# Patient Record
Sex: FEMALE | Race: WHITE | NOT HISPANIC OR LATINO | Employment: OTHER | URBAN - METROPOLITAN AREA
[De-identification: names, ages, dates, MRNs, and addresses within clinical notes are randomized per-mention and may not be internally consistent; named-entity substitution may affect disease eponyms.]

---

## 2023-02-14 ENCOUNTER — TELEPHONE (OUTPATIENT)
Dept: NEUROLOGY | Facility: CLINIC | Age: 73
End: 2023-02-14
Payer: MEDICARE

## 2023-02-14 NOTE — TELEPHONE ENCOUNTER
Spoke briefly with  Viet regarding scheduling options for Sof[facility]. We tentatively secured 2/15/23 at 9:15 AM w/ Dr. Zamudio. I will explore options for a Thursday visit and availability of accomodations at the [facility].      Spoke with Viet to complete patient profile, issue invite to establish portal account, and confirmed 2/15 at 9:15 AM with Dr. Zamudio. I shared some initial information on scheduling with the [facility] after confirming vacancy for the [facility].

## 2023-02-15 ENCOUNTER — OFFICE VISIT (OUTPATIENT)
Dept: NEUROLOGY | Facility: CLINIC | Age: 73
End: 2023-02-15
Payer: MEDICARE

## 2023-02-15 ENCOUNTER — HOSPITAL ENCOUNTER (OUTPATIENT)
Dept: RADIOLOGY | Facility: HOSPITAL | Age: 73
Discharge: HOME | End: 2023-02-15
Attending: PSYCHIATRY & NEUROLOGY
Payer: MEDICARE

## 2023-02-15 VITALS — WEIGHT: 156.63 LBS

## 2023-02-15 DIAGNOSIS — G31.84 MCI (MILD COGNITIVE IMPAIRMENT): Primary | ICD-10-CM

## 2023-02-15 DIAGNOSIS — K90.9 INTESTINAL MALABSORPTION, UNSPECIFIED TYPE: ICD-10-CM

## 2023-02-15 DIAGNOSIS — G31.84 MCI (MILD COGNITIVE IMPAIRMENT): ICD-10-CM

## 2023-02-15 PROCEDURE — 70551 MRI BRAIN STEM W/O DYE: CPT | Mod: 26,,, | Performed by: RADIOLOGY

## 2023-02-15 PROCEDURE — 96132 NRPSYC TST EVAL PHYS/QHP 1ST: CPT | Mod: 59,,, | Performed by: PSYCHIATRY & NEUROLOGY

## 2023-02-15 PROCEDURE — 96132 PR NEUROPSYCHOLOGIC TEST EVAL SVCS, 1ST HR: ICD-10-PCS | Mod: 59,,, | Performed by: PSYCHIATRY & NEUROLOGY

## 2023-02-15 PROCEDURE — 99205 OFFICE O/P NEW HI 60 MIN: CPT | Mod: S$PBB,,, | Performed by: PSYCHIATRY & NEUROLOGY

## 2023-02-15 PROCEDURE — 99213 OFFICE O/P EST LOW 20 MIN: CPT | Mod: PBBFAC,25 | Performed by: PSYCHIATRY & NEUROLOGY

## 2023-02-15 PROCEDURE — 96116 NUBHVL XM PHYS/QHP 1ST HR: CPT | Mod: PBBFAC | Performed by: PSYCHIATRY & NEUROLOGY

## 2023-02-15 PROCEDURE — 70551 MRI BRAIN STEM W/O DYE: CPT | Mod: TC

## 2023-02-15 PROCEDURE — 96116 PR NEUROBEHAVIORAL STATUS EXAM BY PSYCH/PHYS: ICD-10-PCS | Mod: S$PBB,59,, | Performed by: PSYCHIATRY & NEUROLOGY

## 2023-02-15 PROCEDURE — 70551 MRI BRAIN WITHOUT CONTRAST: ICD-10-PCS | Mod: 26,,, | Performed by: RADIOLOGY

## 2023-02-15 PROCEDURE — 99999 PR PBB SHADOW E&M-EST. PATIENT-LVL III: ICD-10-PCS | Mod: PBBFAC,,, | Performed by: PSYCHIATRY & NEUROLOGY

## 2023-02-15 PROCEDURE — 96116 NUBHVL XM PHYS/QHP 1ST HR: CPT | Mod: S$PBB,59,, | Performed by: PSYCHIATRY & NEUROLOGY

## 2023-02-15 PROCEDURE — 99205 PR OFFICE/OUTPT VISIT, NEW, LEVL V, 60-74 MIN: ICD-10-PCS | Mod: S$PBB,,, | Performed by: PSYCHIATRY & NEUROLOGY

## 2023-02-15 PROCEDURE — 99999 PR PBB SHADOW E&M-EST. PATIENT-LVL III: CPT | Mod: PBBFAC,,, | Performed by: PSYCHIATRY & NEUROLOGY

## 2023-02-15 RX ORDER — WARFARIN 7.5 MG/1
7.5 TABLET ORAL DAILY
COMMUNITY

## 2023-02-15 RX ORDER — CAPTOPRIL 12.5 MG/1
12.5 TABLET ORAL 3 TIMES DAILY
COMMUNITY

## 2023-02-15 NOTE — PROGRESS NOTES
[facility]     PATIENT: Sofy Mcmullen  VISIT DATE: 02/15/2023  MRN: [id number]  PRIMARY PROVIDER: Primary Doctor No  : 1950       Chief complaint: Progressive Cognitive Impairment     History of present illness:      The patient is a 73-year-old right-handed female who presents today to the [facility] due to concerns related to Progressive Cognitive Impairment.  The patient is accompanied by the  who participates in providing history.  Additional information is obtained by reviewing available medical records.     Relevant Background/Context  Known Relevant Family history:  Mother -  at age 79 CAD/MI, LOAD  Father -  at age 70 COPD  Neurocognitive Disorder:  Mother - LOAD onset mid to late 70s,  late 70s  Maternal Grandmother - LOAD onset 80s,  90s  Movement Disorder:  The patient/family denies a history of PD, PDD, tremor.  Motorneuron Disorder:  The patient/family denies a history of ALS, MND, PLS.  Developmental Disorder:  The patient/family denies a history of Dyslexia, ADHD, ASD.  Psychiatric Disorder:  The patient/family denies a history of MDD, BD, JANIS, Schizophrenia.  Known Relevant Genetics:  There is no known relevant genetic testing available.  Developmental Milestones:  The patient/family report no known birth complications or early life problems. The patient met all developmental milestones.  Education/Learning Capacity:  The patient/family report no signs or symptoms suggestive of developmental learning disorder.  HS  BA. + 3.5 years  Dental Hygiene +2  Estimated Educational Experience: 18 years of formal education.  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  Retired/Quit: 2015  Relevant Medical History:  HTN  aortic valve replacement     Neurocognitive Disorder Features  Onset/Duration:  Nov 2020 (~2-year)  First Symptom:  Memory impairment  Progression:  Step-wise Progressive  Clinical Course:  Unknown     Current Presentation  Recent/Interim History:  The patient presents with her , who is the primary historian. There are no additional medical records from the patient's previous encounters as such history is limited. Her family reports concerns regarding new cognitive impairment as early as November 2020. Her family stated that around Thanksgiving 2020, the patient's children had told her on multiple occasions they would not be able to attend Thanksving due to COVID-related restrictions. The patient had difficulty remembering this when the holiday came around despite multiple reminders. Her family reported that she became very distressed over 3-4 days. She would need frequent reminders that the children would not be coming for the holidays. In retrospect, her family thought this was atypical; however, there were no overt signs or symptoms concerning short-term memory loss beyond this event. Over the next two years, in COVID-related confinement, the patient's  began reporting increasing concerns about short-term memory loss. The patient's  reports that she became more forgetful, repeating herself and needing reminders of conversations. This was otherwise relatively mild and did not interfere with daily activities. She remained fully independent in all instrumental activities of daily living, including her chores and hobbies. Her family thought this was largely within normal limits for aging. Her behavior/cognition was otherwise stable until December 2022. In December 2022, the patient and her  would travel to [place] for a wedding. Her  reports that he was sick with a nonspecific  viral illness approximately 2-4 weeks before the event, eventually being diagnosed with COVID. The patient would only report minor symptoms of a nonspecific cold during this time. Following the wedding, the patient would wake up in an abulic vs. lethargic state. Her  reports that he had difficulty getting her out of bed. He denies that she was challenging to arouse but lacked the motivation to get out of bed and go home. On the drive home, the patient had difficulty using the map and could not help them drive home. Since this time, the patient has had significant motivation loss. She is largely stopped most household responsibilities and hobbies. Her family reports it is unclear whether the patient has stopped doing these tasks because she is unable to perform them or she lacks the motivation to perform them. Her memory has acutely worsened, having difficulty with attention and concentration train of thought, and she often forgets conversations within minutes. Her family denies any overt language, movement, and visual-spatial problems aside from difficulty navigating the day after the wedding. Regardless the patient has stopped driving. The patient has limited insight into her deficits and has no recollection previous month. The local primary care physician evaluated the patient and ordered a CT head. CT head was reportedly normal; however, imaging and radiological read are unavailable for a secondary review. Her family was present upon referral for rapid decline requesting further evaluation and 2nd opinion.  Unresolved Concern(s) reported by patient/family:  Long COVID vs post-COVID complications  Maternally inherited risk factor for LOAD     Review of cognitive, visuospatial, motor, sensory, and behavioral systems:     Memory:   The patient's memory has worsened in the past few years.  She does repeat statements or asks the same question repeatedly.  She does have difficulty remembering recent important  conversations.  She does have difficulty remembering recent events.  She does forget information within minutes.  Her recent retrograde memory is impaired.  Her remote memory is intact.  Attention:   The patient's attention and concentration are impaired.  She does not have attentional fluctuations.  She does have difficulty with selective attention.  She does become easily distracted.  She does have difficulty with divided attention.  Executive:   The patient's cognitive processing speed is slower.  She does have difficulty with working memory.  She does misplace personal items (e.g., keys, cell phone, wallet) more frequently.  She does have difficulty keeping track of her medications.  She does have difficulty with planning/organizing/completing multistep tasks.  She does have difficulty with executive attention.  She does have difficulty with flexible thinking.  She does not have difficulty with response inhibition.  She denies new impulsivity or rash/careless actions.  Her judgment is intact.  Language:   The patient's speech is affected.  She does forget people's names more frequently.  She does not have word-finding difficulties.  Her speech is fluent and non-effortful.  Her speech is grammatically intact.  She does not make word substitutions.  She does not have difficulty reading.  She does not appear to have impaired comprehension.  Visuospatial:   The patient has new visuospatial problems.  She has become confused or disoriented in *new*, unfamiliar places.  She does not have trouble with navigation.  She does not get lost in familiar places.  She does not have visuospatial disorientation.  She does not have difficulty recognizing objects or faces.  She denies problems with driving or parking.  Motor/Coordination:   The patient does not have difficulty with walking.  She does not feel imbalanced.  She denies having fallen.  She does not appear to have new muscle weakness.  She does not have difficulty  buttoning shirts, operating zippers, or manipulating tools/utensils.  Her handwriting has not become micrographic.  She does not have a resting tremor.  She denies having any new involuntary movements and/or muscle jerking.  She does not have swallowing difficulty.  She denies new muscle cramps and twitching.  Sensory:   The patient denies new numbness, tingling, paresthesias, or pain.  The patient denies a loss of vision, blurry vision, or double vision.  The patient denies new loss of hearing or worsening tinnitus.  The patient denies anosmia.  Sleep:   The patient reports difficulty sleeping.  The patient does not have difficulty going to sleep.  The patient denies difficulty staying asleep or frequently awakening at night.  The patient does snore and/or have witnessed apneas while sleeping.  When she wakes up in the morning, she does not feel well-rested.  She denies dream-enactment behavior.  She denies symptoms suggestive of restless leg syndrome.  Behavior:   The patient's personality has changed.  She does not have symptoms of disinhibition and social inappropriateness.  She does not have symptoms to suggest a loss of manners or decorum.  She does not have apathy and/or decreased motivation.  She does appear to have had a change in behavioral/emotional inertia.  The patient's emotional expression has changed.  She does not have emotional blunting or lability.  She does not have symptoms of irritability and mood lability.  She does not have symptoms of agitation, aggression, or violent outbursts.  Her insight into his disease and situation is impaired.  Her personal hygiene is intact.  She is not exhibiting a diminished response to other people's needs and feelings.  She is not exhibiting a diminished social interest, interrelatedness, or personal warmth.  She denies restlessness.  She denies new and/or worsening simple repetitive behaviors.  Her speech has not become simplified or become  repetitive/stereotyped.  She denies new/worsening complex repetitive/ritualistic compulsions and behaviors.  She does not have symptoms of hyper-religiosity or dogmatism.  Her interests/pleasures have not become restrictive, simplified, interrupting, or repetitive.  She denies a change of self-stimulating behavior.  She has had changes in eating behavior.  She has been exhibited symptoms to suggest increased consumption of food and/or alcohol/cigarettes.  She denies oral exploration or consumption of inedible objects.  Psychiatric:   She does not feel depressed.  She is not exhibiting symptoms of social withdrawal/indifference.  She denies anxiety.  She does not exhibit cycling behavior.  She does not exhibit hyperactive behavior.  She is not exhibited symptoms of paranoia.  She does not have delusions.  She does not have hallucinations.  She does not have a history of sensitivity to neuroleptic/psychotropic medications.  Medical Review of Systems:   The patient does not have constipation.  The patient does not have urinary incontinence.  The patient denies orthostatic lightheadedness.  The patient's weight is stable.  Functional status:  Difficulty performing the following Instrumental ADLs:  Housekeeping: No Comment: unclear, patient appears to have limited motivation, unclear if she is unable to perform  Food Preparation: No Comment: unclear, patient appears to have limited motivation, unclear if she is unable to perform  Shopping: No Comment: unclear, patient appears to have limited motivation, unclear if she is unable to perform  Ability to Handle Finances: Yes  Transportation/Driving: No Comment: unclear, patient appears to have limited motivation, unclear if she is unable to perform  Household Appliances/Stove: No Comment: unclear, patient appears to have limited motivation, unclear if she is unable to perform  Laundry: No Comment: unclear, patient appears to have limited motivation, unclear if she is unable  to perform  Difficulty performing the following Basic ADLs:  Dressing: No  Bathing: No  Toileting: No  Personal hygiene and grooming: No  Feeding: No  Care Management:  Patient/Family Safety Concerns:  Medication Adherence: No  Home Safety: No  Wandered: No  Firearms: No  Fall Risk: No  Home Alone: No          No past medical history on file.    No past surgical history on file.    No family history on file.    Social History     Socioeconomic History    Marital status:        Medication:     Current Outpatient Medications on File Prior to Visit   Medication Sig Dispense Refill    captopriL (CAPOTEN) 12.5 MG tablet Take 12.5 mg by mouth 3 (three) times daily.      warfarin (COUMADIN) 7.5 MG tablet Take 7.5 mg by mouth once daily.       No current facility-administered medications on file prior to visit.        Review of patient's allergies indicates:  No Known Allergies    Medications Reconciliation:   I have reconciled the patient's home medications and discharge medications with the patient/family. I have updated all changes.  Refer to After-Visit Medication List.    Objective:  Vital Signs:  There were no vitals filed for this visit.  Wt Readings from Last 3 Encounters:   02/15/23 0937 71.1 kg (156 lb 10.2 oz)     There is no height or weight on file to calculate BMI.           Neurological examination:  Mental Status:   Her appearance was abnormal.   Comment: looks older than stated age;  Throughout the interview, she is cooperative, her eye contact is appropriate.  Her behavior is appropriate to the clinical context without impropriety or improper language/conduct.  Her behavior was not characterized by episodes of sudden uncontrollable and inappropriate laughing or crying.  The patient's energy level is abnormal.   Comment: Hypoactive;  Her orientation is normal; Spatial 5/5 (location, the floor of building, city, county, state) and temporal 5/5 (month, day, year, GARY) dimensions are accurate.  Her  "attention/concentration is impaired.  She can complete three-step commands.  Her fund of knowledge was appropriate for age, culture, and level of education.  Her thought process is not logical or goal-oriented.   Comment: bradyphrenia;  She demonstrated impaired insight based on actions, awareness of her illness, plans for the future.  She demonstrated good judgment based on actions and plans for the future.  She has no evidence of hallucinations (auditory, visual, olfactory).  She has no evidence of delusions (paranoid, grandiose, bizarre).  Cranial Nerves:   Her pupils were normal.  Her visual fields were full to confrontation in all quadrants.  Her ocular pursuit in the horizontal and vertical plane was complete.  Her saccadic initiation, velocity, and amplitude are normal.  Her eyelid assessment showed no apraxia. There was no eyelid dysfunction, retraction, or webb sign.  Her facial strength was normal.  Her facial expression was symmetric and appropriate to the context.  Her facial expression was normal without evidence of hypomimia.  Her tongue showed no evidence of scalloping.  She can protrude their tongue beyond Her lips for >10 sec.  Speech/Language:   The patient's speech was fluent, non-effortful, and her rate was appropriate to the context.  Her speech volume is within normal range and appropriate to the context.   Comment: quiet;  Her speech rate is normal.  Her respirations are within normal range and appropriate to context.  Her speech timbre is normal.  She has no articulation (segmental features) errors.  The patient's speech is not dysarthric.  The patient's speech was without evidence of anomia.  She makes no phonological loop errors.  She makes no errors during the repetition of gibberish words (e.g., "Supercalifragilisticexpialidocious," "Pigglywiggly," "Woospiedoo," "Zowzy," "Bazinga").  She makes no errors during the repetition of complex meaningless phrases (e.g., "The horse raced past " "the barn fell.", "The complex houses  and single soldiers and their families," "Wishes are hopping, and trees are west," and "Brushing liked to german leon's direction").  She makes no superordinate errors when asked to draw an animal (e.g., elephant/giraffe).  Motor:   The patient's bilateral upper extremity muscle bulk is appropriate.  The patient's upper extremity muscle tone is increased.   Comment: L, Mild;  The patient's bilateral upper extremity muscle tone does not suggest spasticity.  The patient's bilateral upper extremity muscle tone does not suggest rigidity/cogwheeling.  There is evidence of paratonia.   Comment: Muscle tone is increased and there is evidence of paratonia.; Muscle tone is increased and there is evidence of paratonia.  Assessment of motor strength was symmetric and at minimal anti-gravity.  There is no pronator or downward drift.  There is no myoclonus observed in The patient's bilateral upper and lower extremities.  There are no fasciculations observed in The patient's bilateral upper and lower extremities.  Coordination:   She has no bilateral upper extremity limb dysmetria or past pointing on finger-nose-finger bilaterally.  She has no limb dysdiadochokinesia of the upper extremity on the pronation/supination test and screwing in a light bulb or lower extremity during tapping ball of each foot bilaterally.  She has no visible tremor.  She has evidence of interhemispheric motor control deficits.  She demonstrates evidence of motor overflow.  She has dyskinetic movements.   Comment: L>R; L>R  She has evidence of akathisia.  The patient's upper extremity fine motor coordination was abnormal.   Comment: L, Mild;  The patient's bilateral upper extremity coordination with finger tapping, pronation/supination, and the open-close fist showed slowing.  The patient's upper extremity fine motor coordination was not hypometric.   Comment: finger tapping, pronation/supination, and the " open-close fist showed hypometria.; finger tapping, pronation/supination, and the open-close fist showed hypometria.  The patient's upper extremity fine motor coordination was not dysrhythmic.   Comment: finger tapping, pronation/supination, and the open-close fist showed dysrhythmia.; finger tapping, pronation/supination, and the open-close fist showed dysrhythmia.  Higher Cortical Function:   The patient showed no evidence of simultanagnosia (Navon hierarchical letters).  She demonstrates no evidence of dorsal simultanagnosia (overlapping objects).  She demonstrates no evidence of ventral simultanagnosia (complex picture synthesis).  The patient showed no evidence of visuospatial constructional dysfunction.  She has no evidence of dysgraphia.  She showed no dysexecutive behavior.  She showed no utilization or imitation behavior.  She has no perseverative or stereotyped behavior.  She has no stimulus-bound behavior.  Sensory:   Her cortical sensory assessment demonstrated no neglect bilaterally.  She he had no astereognosis (paper clip, ring, dime) bilaterally in the palms.  She he had no agraphesthesia (drawing numbers) in the palms.  Reflexes:   Reflexes were symmetric and 2+ at biceps, 2+ triceps, and 2+ brachioradialis, 2+ at the knees bilaterally, there was no cross-abductor sign, 2+ in the bilateral ankles.  Gait:   She has normal posture sitting unaided.  She is unable to rise from a chair and sit back down without using their arms.  Her gait was abnormal.  Her posture while walking is normal.  Her gait initiation/inhibition was normal.  Her stance while walking is normal.  Her gait speed was abnormal (70-80 F 1.13 m/s M 1.26 m/s, >80 F 0.94 m/sec, M 0.97 m/sec).   Comment: slow;  Her stride (gait cycle) was abnormal.   Comment: decreased step-time;  Her arms swing is abnormal.   Comment: L; asymmetric and decreased amplitude.  She takes turns in >4 steps.  When attempting to walk abnormally (heels, tiptoes,  tandem), she makes no errors.  Neuropsychological Evaluation Summary:  Prior Neurocognitive/Neurobehavioral Evaluation(s)  No Prior Testing Available  Neurocognitive/Neurobehavioral Evaluation completed on 2023-02-15    Memory    Registration-3 3/3 Within Normal Limits.   Recall-3 3/3 Within Normal Limits.   Recall-5 3/5 Impairment: Moderate.   Registration-5 5/0     T1 5/9 Within Normal Limits.   T2 5/9 Within Normal Limits.   T3 6/9 Within Normal Limits.   T4 5/9 Impairment: -2.5 STDs below the average score based on age and education.   T5 5/9 Impairment: -2.5 STDs below the average score based on age and education.   DR-30 Sec 2/9 Impairment: -3.5 STDs below the average score based on age and education.   DR-10 min 3/9 Impairment: -1.6 STDs below the average score based on age and education.   Executive    Three-step command 3/3 Within Normal Limits.   Trials-1 0/1 Impairment: Significant.   WORLD Backward 5/5 Within Normal Limits.   Digit Span - 2 2/2 Within Normal Limits.   Serial Sevens 3/3 Within Normal Limits.   Fluency 1/1 Within Normal Limits.   Digit Span Backwards 4 Impairment: -1.5 STDs below the average score based on age and education.   Lexical Fluency - D 10 Within Normal Limits.   Lexical Fluency - F 9 Impairment: -1.6 STDs below the average score based on age and education.   Lexical Fluency - A 10 Within Normal Limits.   Semantic Fluency - Animals 13 Impairment: -2.1 STDs below the average score based on age and education.   Visuospatial    Intersecting Pentagons 1/1 Within Normal Limits.   3D Cube Copy 0/1 Impairment: Significant.   Clock Draw 3/3 Within Normal Limits.   Yo Copy 14/17 Impairment: Mild to Normal.   Overlapping Images - Update 12/12 Within Normal Limits.   Picture Synthesis 3/3 Within Normal Limits.   Noise Pareidolia Test 5/5 Within Normal Limits.   Attention    Orientation-10 9/10 Impairment: Mild to Normal.   Orientation-6 5/6 Impairment: Mild to Normal.   Alternating  Sequence 1/1 Within Normal Limits.   Digit Span Forwards 7 Within Normal Limits.   Language    Repetition-1 1/1 Within Normal Limits.   Naming-2 2/2 Within Normal Limits.   Following written command 1/1 Within Normal Limits.   Writing a complete sentence 1/1 Within Normal Limits.   Naming-3 3/3 Within Normal Limits.   Repetition-2 2/2 Within Normal Limits.   Abstraction 2/2 Within Normal Limits.   15-Item BNT 15/15 Within Normal Limits.   Repetition of Phrases 5/5 Within Normal Limits.   Verbal Agility 6/6 Within Normal Limits.   SYDBAT - Semantic Association 30/30 Within Normal Limits.   Repeat & Point - Nonfluent 10/10 Within Normal Limits.   Repeat & Point - Semantics 10/10 Within Normal Limits.   Neurocognitive Focused Evaluation Aggregate Score(s)    MMSE 29/30 MMSE Score suggestive of normal to questionable cognitive impairment.   MOCA 25/30 MOCA Score suggestive of mild cognitive impairment.   Neuropsychiatric/Behavioral Focused Evaluation Assessment    BEHAV5+ 1/6 See ROS section for a full description           Neuroimaging:  No brain imaging is currently available for review.     Procedures:  No behavioral neurology relevant procedures are currently available for review.     Clinical Summary:     The patient is a 73-year-old right-handed female with a relevant past medical history of HTN, aortic valve replacement in 2000, who presents reporting a 2-year history of step-wise progressive neurocognitive impairment.       The clinical history is suggestive of:  Memory Impairment: STM encoding impairment, LTM encoding-retrieval impairment, Amnesia of fixation  Attention Impairment: Attention, Selective attention, Sustained attention, Shifting attention  Executive Impairment: Energization, Working Memory  Language Impairment: Language Dysfunction  Visuospatial Impairment: Allocentric Spatial Processing  Behavior Impairment: Response Inhibition, Neurovegetative, Emotional Regulation, Self-Preservation  Dysregulation, Stimulation Dysregulation  iADL Impairment: [place] Instrumental Activities of Daily Living Scale  The neurological examination is significant for:  Cortical Transcallosal Disconnection: interhemispheric motor control (interhemispheric motor control ), motor efference (motor overflow)  Executive Impairment: thought disorder  Movement Disorder (Gait): strength (difficulty rising), abnormal features (speed, stride/cycle, abnormal swing, difficulty turning)  Movement Disorder (Hyperkinetic): dyskinetic movements, akathisia  Movement Disorder (Hypokinetic): paratonia (tone), parkinsonism (bradykinesia), dyskinesia (hypometria, dysrhythmia)  The neurocognitive battery is significant (based on age and education) for:  Attention/Executive predominant multidomain mild cognitive impairment; mild left visual field deficits   Moderate Memory Impairment: She scored >3 standard deviations below the norm on at least one measure. She had difficulty with encoding, recall. She had a steep incomplete learning curve. Her free recall was significantly impaired by time.  Moderate Executive Impairment: She scored >1.5 standard deviations below the norm on at least one measure. She had difficulty with semantic fluency, semantic fluency.  Moderate Visuospatial Impairment: visuospatial construction.  Very Mild Attention Impairment: orientation.  MMSE 29/30: MMSE Score suggestive of normal to questionable cognitive impairment.  MOCA 25/30: MOCA Score suggestive of mild cognitive impairment.  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     The  patient's clinical presentation meets the criteria for Mild Cognitive Impairment (MCI-ADRC) (Emmanuel ATKINS, et al. 2011 Alzheimer's & Dementia).     Concern regarding an intraindividual change in cognition  Impairment in one or more cognitive domains  Preservation of independence in functional abilities.  Not demented     The patient's clinical presentation is concerning for early signs of Late-Onset Alzheimer's Disease (Rufina et al. 2011) potentially provoked/exacerbated by COVID or cerebrovascular disease in 12/2022.     Insidious onset over months to years.  Clear-cut history of worsening cognition by report or observation.  Amnestic presentation: impairment in learning and recall.  The onset of dementia within 3 months following a recognized stroke with abrupt deterioration in cognitive functions  Fluctuating, stepwise progression of cognitive deficits.  Personality and mood changes, abulia, depression, emotional incontinence, or other subcortical deficits including psychomotor retardation and abnormal executive function.     There is notable asymmetry on examination with dyskinetic movements in the LUE with paratonia without clear parkinsonism. Unclear if part of neurodegenerative condition, post-COVID, or a new CNS lesion.    At present, all neurodegenerative diseases can only be diagnosed with 100% certainty through a brain autopsy. The most likely neuropathology underlying the patient's neurocognitive impairment is likely a mixture of pathologies (Alzheimer's Disease Related Pathology, Vascular Contributions to Cognitive Impairment and Dementia) collectively provoked by COVID in 12/2022. In addition to the respiratory and gastrointestinal symptoms that accompany COVID-19, many people who have been exposed to the virus experience short- and/or long-term neuropsychiatric symptoms, including but not limited to cognitive and attention deficits, often referred to as 'brain fog'. For some, these neurological  symptoms persist, and researchers are working to understand the mechanisms by which this brain dysfunction occurs, and what that means for cognitive health long term. The observations made above, were discussed with the patient and their supporting historian(s) ().  Conflicting with his observations is the evidence of focal motor deficits with dyskinetic movements of the left upper extremity.  Unclear if patient had a stroke or other lesion of the basal ganglia or caudate in December of 2022.  Recommend additional diagnostics.    We have discussed the additional diagnostic(s) and/or managenent below.     Care Management Plan:    #Diagnostic Screening for measurable forms of neurodegenerative pathology.  We have discussed opportunities for biomarker testing ([facility] [place] biomarkers, IDEAs Amyloid-PET, Syn-One skin biopsy).  #Diagnostic Screening for reversible forms of neurocognitive disorders  We recommend screening for reversible causes of neurocognitive impairment with plasma laboratories  We have ordered plasma CBC, CMP, Vitamins (B1, B9, B12), Mg, RPR, MMA, TSH, T4, Nfl  We recommend screening for anatomical CNS lesions/neurodegenerative patterns  We have ordered an MRI brain without contrast - Dementia Protocol  #Diagnostic Screening for cardiac conduction impairment before initiation of acetylcholinesterase inhibitor medication.  We have placed an order for a echocardiogram  #Optimize Neurocognitive Impairment and Quality  We have discussed the MIND Diet and other lifestyle behavior that may help maintain brain health.  We have provided written/digital reading material  #Optimize Behavioral Management and Quality.  No indication for memantine at this time  #Behavioral/Environmental Strategies  We recommend engaging in activities that stimulate cognitively and socially while avoiding excessive stimulation and fatigue in overwhelmingly complex situations.  We recommend integrating routine and schedule into  "your daily life. https://www.alzheimersproject.org/news/the-importance-of-routine-and-familiarity-to-persons-with-dementia/  #Health Maintenance/Lifestyle Advice  We have discussed the value in aggressively controlling vascular risk factors like hypertension, hyperlipidemia, and Diabetes SBP<130, LDL<100, A1C<7.0.  We discussed the need to optimize lifestyle choices including a heart-healthy diet (e.g., Mediterranean or DASH), increased cardiovascular exercise (goal 150 minutes of moderate-intensity per week), and stay cognitively and socially active.  We recommend the MIND diet, a combination of two healthy diets: the Mediterranean diet and the DASH (Dietary Approaches to Stop Hypertension) diet, and includes a variety of brain-friendly foods to optimize cognitive health and longevity.  #Support  We all need support sometimes. Get easy access to local resources, community programs, and services. https://www.communityresourcefinder.org/  Learn more about Cognitive Impairment in [place]: https://www.alz.org/professionals/public-health/state-overview/[place]  #Safety  The Alzheimer's Association administers the nationwide "Safe Return" program with identification bracelets, necklaces, or clothing tags and 24-hour assistance. More information is available online at https://www.alz.org/help-support/caregiving/safety/medicalert-with-24-7-wandering-support  #Follow up:  Follow-up in 4 weeks (Mar 2023).    Thank you for allowing us to participate in the care of your patient. Please do not hesitate to contact us with any questions or concerns.     It was a pleasure seeing The patient and we look forward to seeing them at their follow-up visit.     This note is dictated on M*Modal Fluency Direct word recognition program. There are word recognition mistakes that are occasionally missed on review.         Scheduled Follow-up :  Future Appointments   Date Time Provider Department Center   2/15/2023  2:30 PM [facility] " [facility]       After Visit Medication List :     Medication List            Accurate as of February 15, 2023  1:11 PM. If you have any questions, ask your nurse or doctor.                CONTINUE taking these medications      captopriL 12.5 MG tablet  Commonly known as: CAPOTEN     warfarin 7.5 MG tablet  Commonly known as: COUMADIN              Signing Physician:  Afshin Zamudio MD    Billing:          -----------------------------------------------------------------------------    I spent a total of 85 minutes (time-in: 09:45 AM; time-out: 11:10 AM) on 2023-02-15, in person face-to-face with the patient and caregiver(s), >50% of that time was spent counseling regarding the symptoms, treatment plan, risks, therapeutic options, lifestyle modifications, and/or safety issues for the diagnoses above.    10/14 Review of Systems completed and is negative except as stated above in HPI (Systems reviewed: Const, Eyes, ENT, Resp, CV, GI, , MSK, Skin, Neuro)    I performed a neurobehavioral status examination that included a clinical assessment of thinking, reasoning, and judgment. Please see above HPI and ROS for full details. This exam was performed on 2023-02-15 and included 14 minutes spent on direct face-to-face clinical observation and interview with the patient and 17 minutes spent interpreting test results and preparing the report. The total time of 31 minutes spent on the neurobehavioral status examination is not included in the time spent on evaluation and management coding.    I performed a neuropsychological evaluation that included the application of a series of standardized neurocognitive tests. Please see the informal neuropsychological assessment above for full details. This evaluation was performed on 2023-02-15 and included 11 minutes spent on direct face-to-face clinical standardized test administration with the patient and 24 minutes spent on interpreting standardized test results,  integrating patient data into a treatment plan, and providing feedback to the patient and caregiver. The total time of 35 minutes spent on the neuropsychological evaluation is not included in the time spent on evaluation and management coding.    Total Billing time spent on encounter/documentation for this patient's evaluation and management, not including the neurobehavioral status examination and neuropsychological evaluation: 60 minutes.

## 2023-02-18 DIAGNOSIS — I63.89 OTHER CEREBRAL INFARCTION: ICD-10-CM

## 2023-02-18 DIAGNOSIS — Z79.01 CHRONIC ANTICOAGULATION: ICD-10-CM

## 2023-02-18 DIAGNOSIS — I35.9 AORTIC VALVE DISORDER: ICD-10-CM

## 2023-02-18 DIAGNOSIS — I63.421 CEREBROVASCULAR ACCIDENT (CVA) DUE TO EMBOLISM OF RIGHT ANTERIOR CEREBRAL ARTERY: Primary | ICD-10-CM

## 2023-02-21 DIAGNOSIS — G31.84 MCI (MILD COGNITIVE IMPAIRMENT): Primary | ICD-10-CM

## 2023-02-21 DIAGNOSIS — E53.8 B12 DEFICIENCY: ICD-10-CM

## 2023-02-21 RX ORDER — ACETAMINOPHEN, DIPHENHYDRAMINE HCL, PHENYLEPHRINE HCL 325; 25; 5 MG/1; MG/1; MG/1
TABLET ORAL
Qty: 30 TABLET | Refills: 3 | Status: SHIPPED | OUTPATIENT
Start: 2023-02-21

## 2023-03-07 ENCOUNTER — TELEPHONE (OUTPATIENT)
Dept: NEUROLOGY | Facility: CLINIC | Age: 73
End: 2023-03-07
Payer: MEDICARE

## 2023-03-08 NOTE — TELEPHONE ENCOUNTER
Spoke with  Viet regarding completion of echo, to seek clarity on CTA request, and additional updates since the family's most recent call with Dr. Zamudio.     - - -    - Completed echo with physician closer to home and will relay records to us.   - held off on pursuing CTA Head d/t to insurance concerns, and noted they were seeking to release recent CTA reports and imaging from December.  --- I explained that we may still wish to pursue these based on my understanding of the timeline and that we could seek clarity regarding surveillance imaging re: insurance coverage  - I confirmed that [facility] [place] B12 prescription was sent in, but learned that the pharmacy had been unable to fill and was not familiar with this request when Viet had asked. He now suspects a miscommunication as I was able to share additional information or that they were simply out of stock.

## 2023-03-08 NOTE — TELEPHONE ENCOUNTER
----- Message from Shirlene Paniagua MA sent at 3/6/2023 10:36 AM CST -----  Regarding: FW: Return Call  Contact:  @ [phone]    ----- Message -----  From: Traci Chase  Sent: 3/6/2023   9:19 AM CST  To: Lizz GARCIA Staff  Subject: Return Call                                      Message is for Jed Roque's  is calling to discuss test that  ordered. Asking for an urgent call back

## 2023-03-27 ENCOUNTER — TELEPHONE (OUTPATIENT)
Dept: NEUROLOGY | Facility: CLINIC | Age: 73
End: 2023-03-27
Payer: MEDICARE

## 2023-03-27 NOTE — TELEPHONE ENCOUNTER
Returned call to pt's  and he is asking if test results for CTA and Echo. They were completed back in CTA was done at [facility] in [place] in Dec. Echo was at [facility] in [place], [place].     He states he can have the reports sent over to us again.

## 2023-03-27 NOTE — TELEPHONE ENCOUNTER
----- Message from Abdirahman LIZBET Route sent at 3/27/2023  1:39 PM CDT -----  Regarding: Phone Call  Contact: pt. [phone]  Pt. Is calling Requesting a Call back. Patient Requesting Call Back @ pt. [phone]

## 2023-03-28 NOTE — TELEPHONE ENCOUNTER
Spoke to pts  and will have report sent to our office and disc. He wants to wait to follow up once the disc and reports are received.

## 2023-03-31 ENCOUNTER — TELEPHONE (OUTPATIENT)
Dept: NEUROLOGY | Facility: CLINIC | Age: 73
End: 2023-03-31
Payer: MEDICARE

## 2023-03-31 NOTE — TELEPHONE ENCOUNTER
SW called pt's  regarding speech therapy resources in [facility] and to check on scans. SW offered information to speech therapy resources in [place], [place]. HANNAH will send resource information through [employer] for the  to look through.  inquired about echo and CTA records, to which the  responded that he had Faxed after his conversation with the clinic staff. HANNAH will touch base with staff to determine if records were received, and communicate updates with pt's  via [employer] message.

## 2023-03-31 NOTE — TELEPHONE ENCOUNTER
"HANNAH wasn't able to message pt through [employer], so HANNAH called pt's  to update him on finding the echo and CTA records. The records will likely not be reviewed until the end of the day, so the clinic hopes to confirm that he faxxed the documents early next week. Clinic will confirm this update with the pt's  as well.     HANNAH provided the name of the [facility]. Pt's  says that they are "not at that place yet," regarding cognitive and physical impairments. He expressed appreciation for the clinic in finding the resources., and he looks forward to hearing back about the results.  "